# Patient Record
Sex: FEMALE | Race: WHITE | NOT HISPANIC OR LATINO
[De-identification: names, ages, dates, MRNs, and addresses within clinical notes are randomized per-mention and may not be internally consistent; named-entity substitution may affect disease eponyms.]

---

## 2018-05-04 ENCOUNTER — RESULT REVIEW (OUTPATIENT)
Age: 43
End: 2018-05-04

## 2018-11-02 ENCOUNTER — APPOINTMENT (OUTPATIENT)
Dept: MAMMOGRAPHY | Facility: CLINIC | Age: 43
End: 2018-11-02

## 2018-11-02 ENCOUNTER — APPOINTMENT (OUTPATIENT)
Dept: ULTRASOUND IMAGING | Facility: CLINIC | Age: 43
End: 2018-11-02

## 2019-01-31 ENCOUNTER — FORM ENCOUNTER (OUTPATIENT)
Age: 44
End: 2019-01-31

## 2019-02-01 ENCOUNTER — OUTPATIENT (OUTPATIENT)
Dept: OUTPATIENT SERVICES | Facility: HOSPITAL | Age: 44
LOS: 1 days | End: 2019-02-01
Payer: COMMERCIAL

## 2019-02-01 ENCOUNTER — APPOINTMENT (OUTPATIENT)
Dept: MAMMOGRAPHY | Facility: CLINIC | Age: 44
End: 2019-02-01
Payer: COMMERCIAL

## 2019-02-01 DIAGNOSIS — Z12.31 ENCOUNTER FOR SCREENING MAMMOGRAM FOR MALIGNANT NEOPLASM OF BREAST: ICD-10-CM

## 2019-02-01 PROCEDURE — 77067 SCR MAMMO BI INCL CAD: CPT | Mod: 26

## 2019-02-01 PROCEDURE — 77067 SCR MAMMO BI INCL CAD: CPT

## 2019-02-01 PROCEDURE — 77063 BREAST TOMOSYNTHESIS BI: CPT | Mod: 26

## 2019-02-01 PROCEDURE — 77063 BREAST TOMOSYNTHESIS BI: CPT

## 2019-02-09 ENCOUNTER — APPOINTMENT (OUTPATIENT)
Dept: ULTRASOUND IMAGING | Facility: CLINIC | Age: 44
End: 2019-02-09

## 2021-06-18 ENCOUNTER — APPOINTMENT (OUTPATIENT)
Dept: OBGYN | Facility: CLINIC | Age: 46
End: 2021-06-18
Payer: COMMERCIAL

## 2021-06-18 VITALS
SYSTOLIC BLOOD PRESSURE: 112 MMHG | HEIGHT: 67 IN | WEIGHT: 145 LBS | BODY MASS INDEX: 22.76 KG/M2 | TEMPERATURE: 97.7 F | DIASTOLIC BLOOD PRESSURE: 68 MMHG

## 2021-06-18 DIAGNOSIS — Z01.419 ENCOUNTER FOR GYNECOLOGICAL EXAMINATION (GENERAL) (ROUTINE) W/OUT ABNORMAL FINDINGS: ICD-10-CM

## 2021-06-18 DIAGNOSIS — Z12.31 ENCOUNTER FOR SCREENING MAMMOGRAM FOR MALIGNANT NEOPLASM OF BREAST: ICD-10-CM

## 2021-06-18 DIAGNOSIS — Z12.4 ENCOUNTER FOR SCREENING FOR MALIGNANT NEOPLASM OF CERVIX: ICD-10-CM

## 2021-06-18 DIAGNOSIS — Z82.49 FAMILY HISTORY OF ISCHEMIC HEART DISEASE AND OTHER DISEASES OF THE CIRCULATORY SYSTEM: ICD-10-CM

## 2021-06-18 DIAGNOSIS — Z78.9 OTHER SPECIFIED HEALTH STATUS: ICD-10-CM

## 2021-06-18 DIAGNOSIS — R92.2 INCONCLUSIVE MAMMOGRAM: ICD-10-CM

## 2021-06-18 PROCEDURE — 99072 ADDL SUPL MATRL&STAF TM PHE: CPT

## 2021-06-18 PROCEDURE — 99396 PREV VISIT EST AGE 40-64: CPT

## 2021-06-18 RX ORDER — PNV NO.95/FERROUS FUM/FOLIC AC 28MG-0.8MG
TABLET ORAL
Refills: 0 | Status: ACTIVE | COMMUNITY

## 2021-06-18 RX ORDER — MAGNESIUM OXIDE/MAG AA CHELATE 300 MG
CAPSULE ORAL
Refills: 0 | Status: ACTIVE | COMMUNITY

## 2021-06-18 RX ORDER — IRON/IRON ASP GLY/FA/MV-MIN 38 125-25-1MG
TABLET ORAL
Refills: 0 | Status: ACTIVE | COMMUNITY

## 2021-06-18 RX ORDER — CALCIUM CARBONATE/VITAMIN D3 600 MG-10
TABLET ORAL
Refills: 0 | Status: ACTIVE | COMMUNITY

## 2021-06-18 RX ORDER — UBIDECARENONE/VIT E ACET 100MG-5
CAPSULE ORAL
Refills: 0 | Status: ACTIVE | COMMUNITY

## 2021-06-18 NOTE — DISCUSSION/SUMMARY
[FreeTextEntry1] : \par 45 y/o\par gyn annual\par -pap w/ HPV\par -Mammo and breast US script\par \par Menorrhagia \par -return for pelvic ultrasound.\par \par Tubal ligation\par -return for tubal ligation/salpingectomy consult with MD.\par \par rto for pelvic US and tubal consult.

## 2021-06-18 NOTE — PHYSICAL EXAM
[Appropriately responsive] : appropriately responsive [Alert] : alert [No Acute Distress] : no acute distress [Soft] : soft [Non-tender] : non-tender [Non-distended] : non-distended [Oriented x3] : oriented x3 [Examination Of The Breasts] : a normal appearance [No Discharge] : no discharge [No Masses] : no breast masses were palpable [Labia Minora] : normal [Labia Majora] : normal [No Bleeding] : There was no active vaginal bleeding [Normal] : normal

## 2021-06-18 NOTE — HISTORY OF PRESENT ILLNESS
[N] : Patient does not use contraception [Monogamous (Male Partner)] : is monogamous with a male partner [Y] : Positive pregnancy history [Menarche Age: ____] : age at menarche was [unfilled] [No] : Patient does not have concerns regarding sex [Currently Active] : currently active [Men] : men [Mammogramdate] : 2/01/2019 [TextBox_19] : B1 [PapSmeardate] : 5/04/2018 [TextBox_31] : wnl [LMPDate] : 6/03/21 [PGHxTotal] : 2 [[facility] : 2 [FreeTextEntry1] : 6/03/21

## 2021-06-26 ENCOUNTER — RESULT REVIEW (OUTPATIENT)
Age: 46
End: 2021-06-26

## 2021-06-26 ENCOUNTER — APPOINTMENT (OUTPATIENT)
Dept: MAMMOGRAPHY | Facility: CLINIC | Age: 46
End: 2021-06-26
Payer: COMMERCIAL

## 2021-06-26 ENCOUNTER — OUTPATIENT (OUTPATIENT)
Dept: OUTPATIENT SERVICES | Facility: HOSPITAL | Age: 46
LOS: 1 days | End: 2021-06-26
Payer: COMMERCIAL

## 2021-06-26 ENCOUNTER — APPOINTMENT (OUTPATIENT)
Dept: ULTRASOUND IMAGING | Facility: CLINIC | Age: 46
End: 2021-06-26
Payer: COMMERCIAL

## 2021-06-26 DIAGNOSIS — Z00.8 ENCOUNTER FOR OTHER GENERAL EXAMINATION: ICD-10-CM

## 2021-06-26 DIAGNOSIS — R92.8 OTHER ABNORMAL AND INCONCLUSIVE FINDINGS ON DIAGNOSTIC IMAGING OF BREAST: ICD-10-CM

## 2021-06-26 PROCEDURE — 77067 SCR MAMMO BI INCL CAD: CPT

## 2021-06-26 PROCEDURE — 76641 ULTRASOUND BREAST COMPLETE: CPT

## 2021-06-26 PROCEDURE — 77063 BREAST TOMOSYNTHESIS BI: CPT

## 2021-06-26 PROCEDURE — 77067 SCR MAMMO BI INCL CAD: CPT | Mod: 26

## 2021-06-26 PROCEDURE — 77063 BREAST TOMOSYNTHESIS BI: CPT | Mod: 26

## 2021-06-26 PROCEDURE — 76641 ULTRASOUND BREAST COMPLETE: CPT | Mod: 26,50

## 2021-07-02 LAB
CYTOLOGY CVX/VAG DOC THIN PREP: NORMAL
HPV HIGH+LOW RISK DNA PNL CVX: NOT DETECTED

## 2021-07-09 ENCOUNTER — APPOINTMENT (OUTPATIENT)
Dept: OBGYN | Facility: CLINIC | Age: 46
End: 2021-07-09
Payer: COMMERCIAL

## 2021-07-09 ENCOUNTER — ASOB RESULT (OUTPATIENT)
Age: 46
End: 2021-07-09

## 2021-07-09 VITALS
HEIGHT: 67 IN | WEIGHT: 152 LBS | BODY MASS INDEX: 23.86 KG/M2 | DIASTOLIC BLOOD PRESSURE: 78 MMHG | SYSTOLIC BLOOD PRESSURE: 120 MMHG

## 2021-07-09 PROCEDURE — 99213 OFFICE O/P EST LOW 20 MIN: CPT | Mod: 25

## 2021-07-09 PROCEDURE — 99072 ADDL SUPL MATRL&STAF TM PHE: CPT

## 2021-07-09 PROCEDURE — 76830 TRANSVAGINAL US NON-OB: CPT

## 2021-07-09 RX ORDER — IBUPROFEN 600 MG/1
600 TABLET, FILM COATED ORAL EVERY 6 HOURS
Qty: 90 | Refills: 2 | Status: ACTIVE | COMMUNITY
Start: 2021-07-09 | End: 1900-01-01

## 2021-07-10 NOTE — HISTORY OF PRESENT ILLNESS
[N] : Patient reports normal menses [Monogamous (Male Partner)] : is monogamous with a male partner [Y] : Positive pregnancy history [Menarche Age: ____] : age at menarche was [unfilled] [Currently Active] : currently active [Men] : men [Vaginal] : vaginal [No] : No [Yes] : Yes [Condoms] : Condoms [Patient refuses STI testing] : Patient refuses STI testing [TextBox_4] : sono follow up [Mammogramdate] : 6/26/21 [TextBox_19] : br 2 [BreastSonogramDate] : 06/26/21 [TextBox_25] : br 2 [PapSmeardate] : 6/22/21 [TextBox_31] : negative [LMPDate] : 07/1/21 [PGHxTotal] : 2 [[facility]] : 2 [[facility] : 2 [FreeTextEntry1] : 07/01/21

## 2021-07-10 NOTE — DISCUSSION/SUMMARY
[FreeTextEntry1] : -Pelvic sonogram reviewed and discussed with the patient. \par \par -I counselled the patient on the procedure details of a laparoscopic bilateral tubal ligation. The risks of the procedure include, but are not limited to: infection, bleeding, injury to adjacent structures as the bladder, bowel, ureters or major blood vessels.\par \par - I counselled the patient on an intrauterine device. the procedure details, risks and benefits of an intrauterine device was reviewed. INtrauterine device literature was provided. I also explained to the patient that her  can have a vasectomy, but the patient states her  has been procrastinating. No guarantees were made and all questions were answered to her satisfaction.\par \par -Rx Ibuprofen 600 MG sent to her pharmacy. Instructions given.\par \par -Patient was encouraged to register for the portal, if not already registered. The use of the portal for non-emergent communication was discussed - patient is aware to avoid use for emergent issues.\par \par She will call the office with her decision \par \par During this visit 20 minutes were spent face-to-face with greater than 50% of the time dedicated to counseling.\par

## 2021-07-10 NOTE — END OF VISIT
[FreeTextEntry3] : I, Madison Morgan, solely acted as a scribe for Dr. Kt Costa on 07/09/2021 . All medical entries made by the Scribe were at my, Dr. Costa's, direction and personally dictated by me on 07/09/2021. I have reviewed the chart and agree that the record accurately reflects my personal performance of the history, physical exam, assessment and plan. I have also personally directed, reviewed and agreed with the chart.

## 2021-07-11 ENCOUNTER — TRANSCRIPTION ENCOUNTER (OUTPATIENT)
Age: 46
End: 2021-07-11

## 2023-03-31 ENCOUNTER — APPOINTMENT (OUTPATIENT)
Dept: DERMATOLOGY | Facility: CLINIC | Age: 48
End: 2023-03-31
Payer: COMMERCIAL

## 2023-03-31 PROCEDURE — 99204 OFFICE O/P NEW MOD 45 MIN: CPT

## 2023-04-12 ENCOUNTER — APPOINTMENT (OUTPATIENT)
Dept: DERMATOLOGY | Facility: CLINIC | Age: 48
End: 2023-04-12

## 2023-05-05 ENCOUNTER — APPOINTMENT (OUTPATIENT)
Dept: DERMATOLOGY | Facility: CLINIC | Age: 48
End: 2023-05-05

## 2023-05-20 ENCOUNTER — APPOINTMENT (OUTPATIENT)
Dept: ORTHOPEDIC SURGERY | Facility: CLINIC | Age: 48
End: 2023-05-20
Payer: COMMERCIAL

## 2023-05-20 VITALS
DIASTOLIC BLOOD PRESSURE: 80 MMHG | HEART RATE: 80 BPM | BODY MASS INDEX: 24.33 KG/M2 | HEIGHT: 67 IN | SYSTOLIC BLOOD PRESSURE: 118 MMHG | WEIGHT: 155 LBS

## 2023-05-20 DIAGNOSIS — M25.552 PAIN IN LEFT HIP: ICD-10-CM

## 2023-05-20 DIAGNOSIS — M70.62 TROCHANTERIC BURSITIS, LEFT HIP: ICD-10-CM

## 2023-05-20 PROCEDURE — 73502 X-RAY EXAM HIP UNI 2-3 VIEWS: CPT

## 2023-05-20 PROCEDURE — 20552 NJX 1/MLT TRIGGER POINT 1/2: CPT

## 2023-05-20 PROCEDURE — 99203 OFFICE O/P NEW LOW 30 MIN: CPT | Mod: 25

## 2023-05-20 RX ORDER — NAPROXEN 500 MG/1
500 TABLET ORAL
Qty: 20 | Refills: 1 | Status: ACTIVE | COMMUNITY
Start: 2023-05-20 | End: 1900-01-01

## 2023-05-20 NOTE — PHYSICAL EXAM
[de-identified] : The patient is conversive and in no apparent distress. The patient is alert and oriented to person, place, and time. Affect and mood appear normal. The head is normocephalic and atraumatic. Skin shows normal turgor with no evidence of eczema or psoriasis. No respiratory distress noted. Sensation grossly intact. MUSCULOSKELETAL:   SEE BELOW\par \par Left hip exam demonstrates skin is clean, dry intact.  No erythema or ecchymosis.  No surgical scars.  No signs of acute trauma.  Leg lengths are equal.  Normal alignment of the hips, knees and feet.  Normal sensation to light touch distally.  Hip flexion is 70 degrees.  Hip abduction is 40 degrees.  Hip abduction is 20 degrees.  External rotation of 50 degrees.  Internal rotation of 10 degrees.  No noted stiffness or pain or guarding with passive range of motion.  5/5 motor strength of the hips, knees and feet.  Normal sensation to light touch distally. [de-identified] : 2 view left hip and pelvis x-rays were reviewed.  Normal joint spacing.  No lytic lesions.  No fractures.  No signs of osteopenia.

## 2023-05-20 NOTE — PROCEDURE
[de-identified] : Procedure: Injection of the left greater trochanteric bursa. \par Indication:  Inflammation. \par Risk and benefits were discussed with the patient. Potential complications include bleeding and infection. Verbal consent was obtained prior to the procedure. \par Chloraprep was used to prep the area. ethyl chloride spray was used as a topical anesthetic. Using sterile technique, the aspiration/injection needle was then directed from a direct aspect. An 22-gauge was used to inject 5 mL of 1% Lidocaine and 2 mL of 40mg/mL methylprednisolone. A bandage was applied. The patient tolerated the procedure well. \par Complications: none. \par Patient instructed to avoid strenuous activity for 1 day(s). \par Follow-up in the office as needed.

## 2023-05-20 NOTE — HISTORY OF PRESENT ILLNESS
[de-identified] : Patient presents today for initial evaluation of left lateral hip pain.  She has had the pain for about 3 weeks.  It started after a change in her exercise program at [facility].  She reports over the past week the pain has improved.  This is because she has stopped exercising over the past week and did take some over-the-counter Aleve.  She also started to perform some gentle home exercises.  She continues to have some lateral hip discomfort.  No groin discomfort.  No stiffness with donning doffing socks and shoes or getting out of a car.  No direct trauma to the hip area.  It is not associated with back pain.  No radicular symptoms.  She did have previous right hip bursitis years ago.  She states this complaint is very similar in nature.\par \par Review of Systems-\par Constitutional: No fever or chills. \par Cardiovascular: No orthopnea or chest pain\par Pulmonary: No shortness of breath. \par GI: No nausea or vomiting or abdominal pain.\par Musculoskeletal: see HPI \par Psychiatric: No anxiety and depression.

## 2023-05-20 NOTE — DISCUSSION/SUMMARY
[de-identified] : 30 minutes was spent reviewing the x-rays as well as discussing with the patient their clinical presentation, diagnosis and providing education.  The patient appears to have acute left hip trochanteric bursitis versus possible IT band tendinitis.  After discussing various options, the patient wished to proceed with a corticosteroid injection.  This was performed.  She is also recommended a short course of naproxen.  A prescription is sent.  Education instruction provided.  The patient is also recommended a course of physical therapy.  A prescription is provided.  She will start this if she is not improved in about 1 week's time.  She will continue with her home exercise program.  She will slowly resume her home exercise program as she improves.  If she is not improved in approximately 1 month's time she will be seen back.  All questions answered to the patient's satisfaction.

## 2023-05-22 PROBLEM — M25.552 LEFT HIP PAIN: Status: ACTIVE | Noted: 2023-05-20

## 2023-11-03 ENCOUNTER — LABORATORY RESULT (OUTPATIENT)
Age: 48
End: 2023-11-03

## 2023-11-03 ENCOUNTER — APPOINTMENT (OUTPATIENT)
Dept: OBGYN | Facility: CLINIC | Age: 48
End: 2023-11-03
Payer: COMMERCIAL

## 2023-11-03 ENCOUNTER — NON-APPOINTMENT (OUTPATIENT)
Age: 48
End: 2023-11-03

## 2023-11-03 VITALS
SYSTOLIC BLOOD PRESSURE: 146 MMHG | BODY MASS INDEX: 24.8 KG/M2 | WEIGHT: 158 LBS | DIASTOLIC BLOOD PRESSURE: 89 MMHG | HEIGHT: 67 IN

## 2023-11-03 DIAGNOSIS — N92.0 EXCESSIVE AND FREQUENT MENSTRUATION WITH REGULAR CYCLE: ICD-10-CM

## 2023-11-03 DIAGNOSIS — N92.1 EXCESSIVE AND FREQUENT MENSTRUATION WITH IRREGULAR CYCLE: ICD-10-CM

## 2023-11-03 DIAGNOSIS — Z00.00 ENCOUNTER FOR GENERAL ADULT MEDICAL EXAMINATION W/OUT ABNORMAL FINDINGS: ICD-10-CM

## 2023-11-03 PROCEDURE — 99386 PREV VISIT NEW AGE 40-64: CPT

## 2023-11-03 PROCEDURE — 99214 OFFICE O/P EST MOD 30 MIN: CPT | Mod: 25

## 2023-11-08 LAB
CYTOLOGY CVX/VAG DOC THIN PREP: NORMAL
HPV HIGH+LOW RISK DNA PNL CVX: NOT DETECTED

## 2023-11-09 ENCOUNTER — OUTPATIENT (OUTPATIENT)
Dept: OUTPATIENT SERVICES | Facility: HOSPITAL | Age: 48
LOS: 1 days | End: 2023-11-09
Payer: COMMERCIAL

## 2023-11-09 ENCOUNTER — RESULT REVIEW (OUTPATIENT)
Age: 48
End: 2023-11-09

## 2023-11-09 ENCOUNTER — APPOINTMENT (OUTPATIENT)
Dept: MAMMOGRAPHY | Facility: CLINIC | Age: 48
End: 2023-11-09
Payer: COMMERCIAL

## 2023-11-09 DIAGNOSIS — Z00.00 ENCOUNTER FOR GENERAL ADULT MEDICAL EXAMINATION WITHOUT ABNORMAL FINDINGS: ICD-10-CM

## 2023-11-09 PROCEDURE — 77067 SCR MAMMO BI INCL CAD: CPT | Mod: 26

## 2023-11-09 PROCEDURE — 77067 SCR MAMMO BI INCL CAD: CPT

## 2023-11-09 PROCEDURE — 77063 BREAST TOMOSYNTHESIS BI: CPT | Mod: 26

## 2023-11-09 PROCEDURE — 77063 BREAST TOMOSYNTHESIS BI: CPT

## 2023-11-20 ENCOUNTER — APPOINTMENT (OUTPATIENT)
Dept: ANTEPARTUM | Facility: CLINIC | Age: 48
End: 2023-11-20
Payer: COMMERCIAL

## 2023-11-20 ENCOUNTER — APPOINTMENT (OUTPATIENT)
Dept: OBGYN | Facility: CLINIC | Age: 48
End: 2023-11-20
Payer: COMMERCIAL

## 2023-11-20 ENCOUNTER — ASOB RESULT (OUTPATIENT)
Age: 48
End: 2023-11-20

## 2023-11-20 VITALS
WEIGHT: 160 LBS | DIASTOLIC BLOOD PRESSURE: 90 MMHG | BODY MASS INDEX: 25.11 KG/M2 | SYSTOLIC BLOOD PRESSURE: 149 MMHG | HEIGHT: 67 IN

## 2023-11-20 DIAGNOSIS — N92.0 EXCESSIVE AND FREQUENT MENSTRUATION WITH REGULAR CYCLE: ICD-10-CM

## 2023-11-20 PROCEDURE — 76830 TRANSVAGINAL US NON-OB: CPT

## 2023-11-20 PROCEDURE — 76856 US EXAM PELVIC COMPLETE: CPT | Mod: 59

## 2023-11-20 PROCEDURE — 99214 OFFICE O/P EST MOD 30 MIN: CPT

## 2024-04-15 ENCOUNTER — RX RENEWAL (OUTPATIENT)
Age: 49
End: 2024-04-15

## 2024-04-15 RX ORDER — LEVONORGESTREL AND ETHINYL ESTRADIOL AND ETHINYL ESTRADIOL 150-30(84)
0.15-0.03 &0.01 KIT ORAL
Qty: 91 | Refills: 1 | Status: ACTIVE | COMMUNITY
Start: 2024-04-15 | End: 1900-01-01

## 2024-05-08 ENCOUNTER — APPOINTMENT (OUTPATIENT)
Dept: OBGYN | Facility: CLINIC | Age: 49
End: 2024-05-08
Payer: COMMERCIAL

## 2024-05-08 VITALS
DIASTOLIC BLOOD PRESSURE: 88 MMHG | WEIGHT: 168 LBS | HEIGHT: 67 IN | BODY MASS INDEX: 26.37 KG/M2 | SYSTOLIC BLOOD PRESSURE: 151 MMHG

## 2024-05-08 DIAGNOSIS — Z30.011 ENCOUNTER FOR INITIAL PRESCRIPTION OF CONTRACEPTIVE PILLS: ICD-10-CM

## 2024-05-08 PROCEDURE — 99214 OFFICE O/P EST MOD 30 MIN: CPT

## 2024-05-08 RX ORDER — LEVONORGESTREL / ETHINYL ESTRADIOL AND ETHINYL ESTRADIOL 150-30(84)
0.15-0.03 &0.01 KIT ORAL DAILY
Qty: 90 | Refills: 1 | Status: ACTIVE | COMMUNITY
Start: 2023-11-03 | End: 1900-01-01

## 2024-05-08 NOTE — HISTORY OF PRESENT ILLNESS
[FreeTextEntry1] : 48 yo p2 here to fu ocp use, on camrese, had btb first 3 mo cycle, this cycle better.  co poor sleep manual bp today 120/78, elevated on machinr bp.  denies hot flashes, getting some night sweats.  denies dyspareunia kids good denies btb

## 2024-05-08 NOTE — PLAN
[FreeTextEntry1] : The patient is having some labile blood pressures.  Her with the manual cuff she had normal blood pressure.  She was having some significant breakthrough bleeding on the first cycle but is well-controlled for the second 3 months.  She denies dyspareunia.  She reports she is getting some night sweats and wonders when she will be able to go on hormone replacement for menopause.  We discussed often going off around 50 to see if her periods resume and if they do not stop changing switching over to hormone replacement.  We spent 30 minutes in consultation.

## 2025-01-22 ENCOUNTER — APPOINTMENT (OUTPATIENT)
Dept: OBGYN | Facility: CLINIC | Age: 50
End: 2025-01-22

## 2025-06-06 ENCOUNTER — APPOINTMENT (OUTPATIENT)
Dept: OBGYN | Facility: CLINIC | Age: 50
End: 2025-06-06

## 2025-06-06 VITALS
BODY MASS INDEX: 25.9 KG/M2 | SYSTOLIC BLOOD PRESSURE: 163 MMHG | WEIGHT: 165 LBS | DIASTOLIC BLOOD PRESSURE: 90 MMHG | HEIGHT: 67 IN

## 2025-06-06 PROCEDURE — 99214 OFFICE O/P EST MOD 30 MIN: CPT | Mod: 25

## 2025-06-06 PROCEDURE — 99396 PREV VISIT EST AGE 40-64: CPT

## 2025-06-06 RX ORDER — ETONOGESTREL AND ETHINYL ESTRADIOL 11.7; 2.7 MG/1; MG/1
0.12-0.015 INSERT, EXTENDED RELEASE VAGINAL
Qty: 4 | Refills: 1 | Status: ACTIVE | COMMUNITY
Start: 2025-06-06 | End: 1900-01-01

## 2025-06-10 LAB — HPV HIGH+LOW RISK DNA PNL CVX: NOT DETECTED

## 2025-06-11 LAB — CYTOLOGY CVX/VAG DOC THIN PREP: NORMAL

## 2025-08-15 ENCOUNTER — NON-APPOINTMENT (OUTPATIENT)
Age: 50
End: 2025-08-15

## 2025-09-04 ENCOUNTER — RX RENEWAL (OUTPATIENT)
Age: 50
End: 2025-09-04